# Patient Record
Sex: FEMALE | Race: WHITE | URBAN - METROPOLITAN AREA
[De-identification: names, ages, dates, MRNs, and addresses within clinical notes are randomized per-mention and may not be internally consistent; named-entity substitution may affect disease eponyms.]

---

## 2018-12-17 ENCOUNTER — OFFICE VISIT (OUTPATIENT)
Dept: URBAN - METROPOLITAN AREA CLINIC 16 | Facility: CLINIC | Age: 68
End: 2018-12-17
Payer: MEDICARE

## 2018-12-17 DIAGNOSIS — H25.13 AGE-RELATED NUCLEAR CATARACT, BILATERAL: Primary | ICD-10-CM

## 2018-12-17 DIAGNOSIS — Z98.890 PERSONAL HISTORY OF SURGERY: ICD-10-CM

## 2018-12-17 PROCEDURE — 92004 COMPRE OPH EXAM NEW PT 1/>: CPT | Performed by: OPTOMETRIST

## 2018-12-17 ASSESSMENT — KERATOMETRY
OD: 40.63
OS: 40.25

## 2018-12-17 ASSESSMENT — INTRAOCULAR PRESSURE
OS: 14
OD: 14

## 2018-12-17 NOTE — IMPRESSION/PLAN
Impression: Personal history of surgery: Z98.890. Hx of LASIK OU Plan: Discussed diagnosis. Will continue to observe.